# Patient Record
Sex: FEMALE | URBAN - METROPOLITAN AREA
[De-identification: names, ages, dates, MRNs, and addresses within clinical notes are randomized per-mention and may not be internally consistent; named-entity substitution may affect disease eponyms.]

---

## 2020-02-18 ENCOUNTER — OFFICE VISIT (OUTPATIENT)
Dept: URBAN - METROPOLITAN AREA CLINIC 23 | Facility: CLINIC | Age: 55
End: 2020-02-18
Payer: COMMERCIAL

## 2020-02-18 DIAGNOSIS — H11.012 AMYLOID PTERYGIUM OF LEFT EYE: Primary | ICD-10-CM

## 2020-02-18 PROCEDURE — 92025 CPTRIZED CORNEAL TOPOGRAPHY: CPT | Performed by: OPHTHALMOLOGY

## 2020-02-18 PROCEDURE — 99203 OFFICE O/P NEW LOW 30 MIN: CPT | Performed by: OPHTHALMOLOGY

## 2020-02-18 RX ORDER — FLUOROMETHOLONE 1 MG/ML
0.1 % SUSPENSION/ DROPS OPHTHALMIC
Qty: 5 | Refills: 0 | Status: ACTIVE
Start: 2020-02-18

## 2020-02-18 ASSESSMENT — INTRAOCULAR PRESSURE
OS: 12
OD: 11

## 2020-02-18 NOTE — IMPRESSION/PLAN
Impression: Amyloid pterygium of left eye: H11.012. Condition: will continue to monitor. Vision: vision not threatened. Plan: Discussed diagnosis in detail with patient. Discussed treatment options with patient. Discussed risks of progression. Patient instructed to use artificial tears as needed. New medication(s) Rx given today. FML BID OS. Will continue to observe condition and or symptoms. Patient instructed to call if condition gets worse.